# Patient Record
Sex: MALE | Race: WHITE | NOT HISPANIC OR LATINO | Employment: OTHER | URBAN - METROPOLITAN AREA
[De-identification: names, ages, dates, MRNs, and addresses within clinical notes are randomized per-mention and may not be internally consistent; named-entity substitution may affect disease eponyms.]

---

## 2018-01-11 ENCOUNTER — OFFICE VISIT (OUTPATIENT)
Dept: ORTHOPEDIC SURGERY | Facility: CLINIC | Age: 68
End: 2018-01-11

## 2018-01-11 VITALS — HEIGHT: 60 IN | TEMPERATURE: 98.4 F | WEIGHT: 194.6 LBS | BODY MASS INDEX: 38.21 KG/M2

## 2018-01-11 DIAGNOSIS — M25.552 HIP PAIN, LEFT: Primary | ICD-10-CM

## 2018-01-11 PROCEDURE — 99203 OFFICE O/P NEW LOW 30 MIN: CPT | Performed by: ORTHOPAEDIC SURGERY

## 2018-01-11 PROCEDURE — 73502 X-RAY EXAM HIP UNI 2-3 VIEWS: CPT | Performed by: ORTHOPAEDIC SURGERY

## 2018-01-11 RX ORDER — ACETAMINOPHEN 500 MG
500 TABLET ORAL EVERY 6 HOURS PRN
COMMUNITY

## 2018-01-11 RX ORDER — ASPIRIN 81 MG/1
81 TABLET ORAL DAILY
COMMUNITY

## 2018-01-11 RX ORDER — LISINOPRIL 40 MG/1
40 TABLET ORAL DAILY
COMMUNITY
End: 2019-01-01 | Stop reason: HOSPADM

## 2018-01-11 RX ORDER — CHLORTHALIDONE 25 MG/1
25 TABLET ORAL DAILY
COMMUNITY

## 2018-01-11 RX ORDER — ATORVASTATIN CALCIUM 40 MG/1
40 TABLET, FILM COATED ORAL DAILY
COMMUNITY

## 2018-01-11 RX ORDER — ESCITALOPRAM OXALATE 10 MG/1
10 TABLET ORAL DAILY
COMMUNITY

## 2018-01-11 RX ORDER — UBIDECARENONE 75 MG
50 CAPSULE ORAL DAILY
COMMUNITY

## 2018-01-11 NOTE — PROGRESS NOTES
Patient: Russell Pagan  YOB: 1950 67 y.o. male  Medical Record Number: [id number]    Chief Complaints:   Chief Complaint   Patient presents with   • Left Hip - Pain, Establish Care       History of Present Illness:Russell Pagan is a 67 y.o. male who presents with  Complaints of left hip pain.  He is here with a caretaker from his nursing home and is not a good historian and has trouble relating information to me.  His caretaker is not completely familiar with his history and current situation.  According to the notes from [facility] he has a history of Alzheimer's, hypertension, alcoholism, multiple strokes and is on multiple medications for the above.  He reports some pain throughout the hip region which is listed as severe.  He states that he could live with it as it is.  He does have pain which wakes him up at night.  He uses a walker.    Allergies: No Known Allergies    Medications:   Current Outpatient Prescriptions   Medication Sig Dispense Refill   • acetaminophen (TYLENOL) 500 MG tablet Take 500 mg by mouth Every 6 (Six) Hours As Needed for Mild Pain .     • aspirin 81 MG EC tablet Take 81 mg by mouth Daily.     • atorvastatin (LIPITOR) 40 MG tablet Take 40 mg by mouth Daily.     • chlorthalidone (HYGROTON) 25 MG tablet Take 25 mg by mouth Daily.     • escitalopram (LEXAPRO) 10 MG tablet Take 10 mg by mouth Daily.     • lisinopril (PRINIVIL,ZESTRIL) 40 MG tablet Take 40 mg by mouth Daily.     • vitamin B-12 (CYANOCOBALAMIN) 100 MCG tablet Take 50 mcg by mouth Daily.       No current facility-administered medications for this visit.          The following portions of the patient's history were reviewed and updated as appropriate: allergies, current medications, past family history, past medical history, past social history, past surgical history and problem list.    Review of Systems:   A 14 point review of systems was performed. All systems negative except pertinent  "positives/negative listed in HPI above    Physical Exam:   Vitals:    01/11/18 0845   Temp: 98.4 °F (36.9 °C)   TempSrc: Temporal Artery    Weight: 88.3 kg (194 lb 9.6 oz)   Height: 64 cm (25.2\")       General: A and O x 3, ASA, NAD    SCLERA:    Normal    DENTITION:   Normal  Hip:  left    LEG ALIGNMENT:     Neutral        LEG LENGTH DISCREPANCY   :    Difficult to assess given bilateral hip flexion contractures and general location of the stiffness    GAIT:     Antalgic with walker    SKIN:     No abnormality    RANGE OF MOTION:     Limited by joint irritability    STRENGTH:     Limited by joint irratibility    DISTAL PULSES:    Paplable    DISTAL SENSATION :   Intact    LYMPHATICS:     No   lymphadenopathy    OTHER:          +   Stinchfeld test      -    log roll      -   Tenderness to palpation trochanteric bursa        Radiology:  Xrays 2views left hip (AP bilateral hips, and lateral of the hip) ordered and reviewed for evaluation of hip pain  demonstrating  advanced, end-satge osteoarthritis with bone on bone articluation, periarticular osteophytes, and subchondral cysts. There are no previous films for comparision.    Assessment/Plan: Left hip osteoarthritis advanced in nature.  Unfortunately I feel that elective hip replacement surgery would carry with it significant risk in this patient which are elevated much more so than in comparison to the average person.  He has medical and cognitive issues which I think would preclude hip replacement surgery.  I've recommended continued use of a walker.  He can take Tylenol for pain.  She continue with therapy exercises for mobility at his nursing home/rehabilitation center.  I'll see him back on an as-needed basis.      Handy Marcano MD  1/11/2018  "

## 2018-12-27 ENCOUNTER — APPOINTMENT (OUTPATIENT)
Dept: CT IMAGING | Facility: HOSPITAL | Age: 68
End: 2018-12-27

## 2018-12-27 ENCOUNTER — HOSPITAL ENCOUNTER (INPATIENT)
Facility: HOSPITAL | Age: 68
LOS: 5 days | Discharge: NURSING, REHAB OR LONG-TERM CARE | End: 2019-01-01
Attending: EMERGENCY MEDICINE | Admitting: INTERNAL MEDICINE

## 2018-12-27 DIAGNOSIS — R22.1 LOCALIZED SWELLING, MASS OR LUMP OF NECK: ICD-10-CM

## 2018-12-27 DIAGNOSIS — R26.2 DIFFICULTY WALKING: ICD-10-CM

## 2018-12-27 DIAGNOSIS — L03.221 CELLULITIS OF NECK: Primary | ICD-10-CM

## 2018-12-27 LAB
ALBUMIN SERPL-MCNC: 3.8 G/DL (ref 3.5–5.2)
ALBUMIN/GLOB SERPL: 0.9 G/DL
ALP SERPL-CCNC: 87 U/L (ref 39–117)
ALT SERPL W P-5'-P-CCNC: 15 U/L (ref 1–41)
ANION GAP SERPL CALCULATED.3IONS-SCNC: 10.5 MMOL/L
AST SERPL-CCNC: 21 U/L (ref 1–40)
BASOPHILS # BLD AUTO: 0.01 10*3/MM3 (ref 0–0.2)
BASOPHILS NFR BLD AUTO: 0.1 % (ref 0–1.5)
BILIRUB SERPL-MCNC: 0.7 MG/DL (ref 0.1–1.2)
BUN BLD-MCNC: 19 MG/DL (ref 8–23)
BUN/CREAT SERPL: 20.7 (ref 7–25)
CALCIUM SPEC-SCNC: 9.4 MG/DL (ref 8.6–10.5)
CHLORIDE SERPL-SCNC: 99 MMOL/L (ref 98–107)
CO2 SERPL-SCNC: 23.5 MMOL/L (ref 22–29)
CREAT BLD-MCNC: 0.92 MG/DL (ref 0.76–1.27)
CRP SERPL-MCNC: 12.67 MG/DL (ref 0–0.5)
D-LACTATE SERPL-SCNC: 0.5 MMOL/L (ref 0.5–2)
DEPRECATED RDW RBC AUTO: 48.3 FL (ref 37–54)
EOSINOPHIL # BLD AUTO: 0.01 10*3/MM3 (ref 0–0.7)
EOSINOPHIL NFR BLD AUTO: 0.1 % (ref 0.3–6.2)
ERYTHROCYTE [DISTWIDTH] IN BLOOD BY AUTOMATED COUNT: 14.3 % (ref 11.5–14.5)
ERYTHROCYTE [SEDIMENTATION RATE] IN BLOOD: 78 MM/HR (ref 0–20)
GFR SERPL CREATININE-BSD FRML MDRD: 82 ML/MIN/1.73
GLOBULIN UR ELPH-MCNC: 4.4 GM/DL
GLUCOSE BLD-MCNC: 101 MG/DL (ref 65–99)
HCT VFR BLD AUTO: 30.6 % (ref 40.4–52.2)
HGB BLD-MCNC: 10.2 G/DL (ref 13.7–17.6)
IMM GRANULOCYTES # BLD AUTO: 0.03 10*3/MM3 (ref 0–0.03)
IMM GRANULOCYTES NFR BLD AUTO: 0.3 % (ref 0–0.5)
INR PPP: 1.3 (ref 0.9–1.1)
LYMPHOCYTES # BLD AUTO: 1.33 10*3/MM3 (ref 0.9–4.8)
LYMPHOCYTES NFR BLD AUTO: 12 % (ref 19.6–45.3)
MCH RBC QN AUTO: 30.5 PG (ref 27–32.7)
MCHC RBC AUTO-ENTMCNC: 33.3 G/DL (ref 32.6–36.4)
MCV RBC AUTO: 91.6 FL (ref 79.8–96.2)
MONOCYTES # BLD AUTO: 1.51 10*3/MM3 (ref 0.2–1.2)
MONOCYTES NFR BLD AUTO: 13.6 % (ref 5–12)
NEUTROPHILS # BLD AUTO: 8.21 10*3/MM3 (ref 1.9–8.1)
NEUTROPHILS NFR BLD AUTO: 74.2 % (ref 42.7–76)
PLATELET # BLD AUTO: 168 10*3/MM3 (ref 140–500)
PMV BLD AUTO: 9.9 FL (ref 6–12)
POTASSIUM BLD-SCNC: 4.9 MMOL/L (ref 3.5–5.2)
PROCALCITONIN SERPL-MCNC: 0.2 NG/ML (ref 0.1–0.25)
PROT SERPL-MCNC: 8.2 G/DL (ref 6–8.5)
PROTHROMBIN TIME: 16 SECONDS (ref 11.7–14.2)
RBC # BLD AUTO: 3.34 10*6/MM3 (ref 4.6–6)
SODIUM BLD-SCNC: 133 MMOL/L (ref 136–145)
WBC NRBC COR # BLD: 11.07 10*3/MM3 (ref 4.5–10.7)

## 2018-12-27 PROCEDURE — 85025 COMPLETE CBC W/AUTO DIFF WBC: CPT | Performed by: EMERGENCY MEDICINE

## 2018-12-27 PROCEDURE — 25010000002 IOPAMIDOL 61 % SOLUTION: Performed by: EMERGENCY MEDICINE

## 2018-12-27 PROCEDURE — 80053 COMPREHEN METABOLIC PANEL: CPT | Performed by: EMERGENCY MEDICINE

## 2018-12-27 PROCEDURE — 25010000002 PIPERACILLIN SOD-TAZOBACTAM PER 1 G: Performed by: EMERGENCY MEDICINE

## 2018-12-27 PROCEDURE — 85610 PROTHROMBIN TIME: CPT | Performed by: EMERGENCY MEDICINE

## 2018-12-27 PROCEDURE — 86140 C-REACTIVE PROTEIN: CPT | Performed by: EMERGENCY MEDICINE

## 2018-12-27 PROCEDURE — 70491 CT SOFT TISSUE NECK W/DYE: CPT

## 2018-12-27 PROCEDURE — 99284 EMERGENCY DEPT VISIT MOD MDM: CPT

## 2018-12-27 PROCEDURE — 87040 BLOOD CULTURE FOR BACTERIA: CPT | Performed by: EMERGENCY MEDICINE

## 2018-12-27 PROCEDURE — 85652 RBC SED RATE AUTOMATED: CPT | Performed by: EMERGENCY MEDICINE

## 2018-12-27 PROCEDURE — 84145 PROCALCITONIN (PCT): CPT | Performed by: EMERGENCY MEDICINE

## 2018-12-27 PROCEDURE — 83605 ASSAY OF LACTIC ACID: CPT | Performed by: EMERGENCY MEDICINE

## 2018-12-27 RX ORDER — ESCITALOPRAM OXALATE 10 MG/1
10 TABLET ORAL DAILY
Status: DISCONTINUED | OUTPATIENT
Start: 2018-12-28 | End: 2019-01-01 | Stop reason: HOSPADM

## 2018-12-27 RX ORDER — ASPIRIN 81 MG/1
81 TABLET ORAL DAILY
Status: DISCONTINUED | OUTPATIENT
Start: 2018-12-28 | End: 2019-01-01 | Stop reason: HOSPADM

## 2018-12-27 RX ORDER — CHLORTHALIDONE 25 MG/1
25 TABLET ORAL DAILY
Status: DISCONTINUED | OUTPATIENT
Start: 2018-12-28 | End: 2019-01-01 | Stop reason: HOSPADM

## 2018-12-27 RX ORDER — ACETAMINOPHEN 500 MG
500 TABLET ORAL EVERY 6 HOURS PRN
Status: DISCONTINUED | OUTPATIENT
Start: 2018-12-27 | End: 2019-01-01 | Stop reason: HOSPADM

## 2018-12-27 RX ORDER — SODIUM CHLORIDE 9 MG/ML
75 INJECTION, SOLUTION INTRAVENOUS CONTINUOUS
Status: DISCONTINUED | OUTPATIENT
Start: 2018-12-27 | End: 2019-01-01

## 2018-12-27 RX ORDER — ATORVASTATIN CALCIUM 20 MG/1
40 TABLET, FILM COATED ORAL DAILY
Status: DISCONTINUED | OUTPATIENT
Start: 2018-12-28 | End: 2019-01-01 | Stop reason: HOSPADM

## 2018-12-27 RX ORDER — HYDROCODONE BITARTRATE AND ACETAMINOPHEN 7.5; 325 MG/1; MG/1
1 TABLET ORAL EVERY 6 HOURS PRN
Status: DISCONTINUED | OUTPATIENT
Start: 2018-12-27 | End: 2019-01-01 | Stop reason: HOSPADM

## 2018-12-27 RX ADMIN — SODIUM CHLORIDE 125 ML/HR: 9 INJECTION, SOLUTION INTRAVENOUS at 15:04

## 2018-12-27 RX ADMIN — IOPAMIDOL 75 ML: 612 INJECTION, SOLUTION INTRAVENOUS at 16:30

## 2018-12-27 RX ADMIN — TAZOBACTAM SODIUM AND PIPERACILLIN SODIUM 3.38 G: 375; 3 INJECTION, SOLUTION INTRAVENOUS at 17:24

## 2018-12-28 PROBLEM — I10 PRIMARY HYPERTENSION: Status: ACTIVE | Noted: 2018-12-28

## 2018-12-28 PROBLEM — F02.80 ALZHEIMER'S DISEASE (HCC): Status: ACTIVE | Noted: 2018-12-28

## 2018-12-28 PROBLEM — F41.8 DEPRESSION WITH ANXIETY: Status: ACTIVE | Noted: 2018-12-28

## 2018-12-28 PROBLEM — I69.328 SPEECH AND LANGUAGE DEFICIT AS LATE EFFECT OF CEREBROVASCULAR ACCIDENT (CVA): Status: ACTIVE | Noted: 2018-12-28

## 2018-12-28 PROBLEM — F01.50 VASCULAR DEMENTIA WITHOUT BEHAVIORAL DISTURBANCE (HCC): Status: ACTIVE | Noted: 2018-12-28

## 2018-12-28 PROBLEM — G30.9 ALZHEIMER'S DISEASE (HCC): Status: ACTIVE | Noted: 2018-12-28

## 2018-12-28 PROBLEM — I69.320 APHASIA AS LATE EFFECT OF CEREBROVASCULAR ACCIDENT: Status: ACTIVE | Noted: 2018-12-28

## 2018-12-28 LAB
ANION GAP SERPL CALCULATED.3IONS-SCNC: 12.4 MMOL/L
BUN BLD-MCNC: 17 MG/DL (ref 8–23)
BUN/CREAT SERPL: 22.1 (ref 7–25)
CALCIUM SPEC-SCNC: 9.2 MG/DL (ref 8.6–10.5)
CHLORIDE SERPL-SCNC: 103 MMOL/L (ref 98–107)
CO2 SERPL-SCNC: 20.6 MMOL/L (ref 22–29)
CREAT BLD-MCNC: 0.77 MG/DL (ref 0.76–1.27)
DEPRECATED RDW RBC AUTO: 49.6 FL (ref 37–54)
ERYTHROCYTE [DISTWIDTH] IN BLOOD BY AUTOMATED COUNT: 14.3 % (ref 11.5–14.5)
GFR SERPL CREATININE-BSD FRML MDRD: 101 ML/MIN/1.73
GLUCOSE BLD-MCNC: 92 MG/DL (ref 65–99)
HCT VFR BLD AUTO: 33.8 % (ref 40.4–52.2)
HGB BLD-MCNC: 11 G/DL (ref 13.7–17.6)
MCH RBC QN AUTO: 30.8 PG (ref 27–32.7)
MCHC RBC AUTO-ENTMCNC: 32.5 G/DL (ref 32.6–36.4)
MCV RBC AUTO: 94.7 FL (ref 79.8–96.2)
PLATELET # BLD AUTO: 184 10*3/MM3 (ref 140–500)
PMV BLD AUTO: 10.2 FL (ref 6–12)
POTASSIUM BLD-SCNC: 5.4 MMOL/L (ref 3.5–5.2)
RBC # BLD AUTO: 3.57 10*6/MM3 (ref 4.6–6)
SODIUM BLD-SCNC: 136 MMOL/L (ref 136–145)
WBC NRBC COR # BLD: 12.46 10*3/MM3 (ref 4.5–10.7)

## 2018-12-28 PROCEDURE — 97165 OT EVAL LOW COMPLEX 30 MIN: CPT

## 2018-12-28 PROCEDURE — G8987 SELF CARE CURRENT STATUS: HCPCS

## 2018-12-28 PROCEDURE — 97162 PT EVAL MOD COMPLEX 30 MIN: CPT

## 2018-12-28 PROCEDURE — 97110 THERAPEUTIC EXERCISES: CPT

## 2018-12-28 PROCEDURE — G8989 SELF CARE D/C STATUS: HCPCS

## 2018-12-28 PROCEDURE — 25010000002 PIPERACILLIN SOD-TAZOBACTAM PER 1 G: Performed by: INTERNAL MEDICINE

## 2018-12-28 PROCEDURE — G8978 MOBILITY CURRENT STATUS: HCPCS

## 2018-12-28 PROCEDURE — G8988 SELF CARE GOAL STATUS: HCPCS

## 2018-12-28 PROCEDURE — 85027 COMPLETE CBC AUTOMATED: CPT | Performed by: INTERNAL MEDICINE

## 2018-12-28 PROCEDURE — 80048 BASIC METABOLIC PNL TOTAL CA: CPT | Performed by: INTERNAL MEDICINE

## 2018-12-28 PROCEDURE — G8979 MOBILITY GOAL STATUS: HCPCS

## 2018-12-28 RX ADMIN — TAZOBACTAM SODIUM AND PIPERACILLIN SODIUM 3.38 G: 375; 3 INJECTION, SOLUTION INTRAVENOUS at 02:45

## 2018-12-28 RX ADMIN — TAZOBACTAM SODIUM AND PIPERACILLIN SODIUM 3.38 G: 375; 3 INJECTION, SOLUTION INTRAVENOUS at 09:48

## 2018-12-28 RX ADMIN — TAZOBACTAM SODIUM AND PIPERACILLIN SODIUM 3.38 G: 375; 3 INJECTION, SOLUTION INTRAVENOUS at 18:00

## 2018-12-29 LAB
ANION GAP SERPL CALCULATED.3IONS-SCNC: 9.9 MMOL/L
BUN BLD-MCNC: 19 MG/DL (ref 8–23)
BUN/CREAT SERPL: 22.6 (ref 7–25)
CALCIUM SPEC-SCNC: 9.1 MG/DL (ref 8.6–10.5)
CHLORIDE SERPL-SCNC: 103 MMOL/L (ref 98–107)
CO2 SERPL-SCNC: 25.1 MMOL/L (ref 22–29)
CREAT BLD-MCNC: 0.84 MG/DL (ref 0.76–1.27)
DEPRECATED RDW RBC AUTO: 50.6 FL (ref 37–54)
ERYTHROCYTE [DISTWIDTH] IN BLOOD BY AUTOMATED COUNT: 14.1 % (ref 11.5–14.5)
GFR SERPL CREATININE-BSD FRML MDRD: 91 ML/MIN/1.73
GLUCOSE BLD-MCNC: 97 MG/DL (ref 65–99)
HCT VFR BLD AUTO: 36.1 % (ref 40.4–52.2)
HGB BLD-MCNC: 11.5 G/DL (ref 13.7–17.6)
MCH RBC QN AUTO: 30.7 PG (ref 27–32.7)
MCHC RBC AUTO-ENTMCNC: 31.9 G/DL (ref 32.6–36.4)
MCV RBC AUTO: 96.5 FL (ref 79.8–96.2)
PLATELET # BLD AUTO: 192 10*3/MM3 (ref 140–500)
PMV BLD AUTO: 10.7 FL (ref 6–12)
POTASSIUM BLD-SCNC: 3.9 MMOL/L (ref 3.5–5.2)
RBC # BLD AUTO: 3.74 10*6/MM3 (ref 4.6–6)
SODIUM BLD-SCNC: 138 MMOL/L (ref 136–145)
WBC NRBC COR # BLD: 7.01 10*3/MM3 (ref 4.5–10.7)

## 2018-12-29 PROCEDURE — G8998 SWALLOW D/C STATUS: HCPCS

## 2018-12-29 PROCEDURE — 85027 COMPLETE CBC AUTOMATED: CPT | Performed by: INTERNAL MEDICINE

## 2018-12-29 PROCEDURE — 92610 EVALUATE SWALLOWING FUNCTION: CPT

## 2018-12-29 PROCEDURE — 97110 THERAPEUTIC EXERCISES: CPT

## 2018-12-29 PROCEDURE — G8996 SWALLOW CURRENT STATUS: HCPCS

## 2018-12-29 PROCEDURE — G8997 SWALLOW GOAL STATUS: HCPCS

## 2018-12-29 PROCEDURE — 25010000002 PIPERACILLIN SOD-TAZOBACTAM PER 1 G: Performed by: INTERNAL MEDICINE

## 2018-12-29 PROCEDURE — 80048 BASIC METABOLIC PNL TOTAL CA: CPT | Performed by: INTERNAL MEDICINE

## 2018-12-29 RX ADMIN — ESCITALOPRAM 10 MG: 10 TABLET, FILM COATED ORAL at 09:10

## 2018-12-29 RX ADMIN — CHLORTHALIDONE 25 MG: 25 TABLET ORAL at 09:10

## 2018-12-29 RX ADMIN — TAZOBACTAM SODIUM AND PIPERACILLIN SODIUM 3.38 G: 375; 3 INJECTION, SOLUTION INTRAVENOUS at 16:01

## 2018-12-29 RX ADMIN — HYDROCODONE BITARTRATE AND ACETAMINOPHEN 1 TABLET: 7.5; 325 TABLET ORAL at 09:10

## 2018-12-29 RX ADMIN — ATORVASTATIN CALCIUM 40 MG: 20 TABLET, FILM COATED ORAL at 09:10

## 2018-12-29 RX ADMIN — ASPIRIN 81 MG: 81 TABLET, DELAYED RELEASE ORAL at 09:10

## 2018-12-29 RX ADMIN — TAZOBACTAM SODIUM AND PIPERACILLIN SODIUM 3.38 G: 375; 3 INJECTION, SOLUTION INTRAVENOUS at 09:18

## 2018-12-29 RX ADMIN — TAZOBACTAM SODIUM AND PIPERACILLIN SODIUM 3.38 G: 375; 3 INJECTION, SOLUTION INTRAVENOUS at 01:39

## 2018-12-30 LAB
ANION GAP SERPL CALCULATED.3IONS-SCNC: 11.6 MMOL/L
BUN BLD-MCNC: 17 MG/DL (ref 8–23)
BUN/CREAT SERPL: 20.5 (ref 7–25)
CALCIUM SPEC-SCNC: 9.3 MG/DL (ref 8.6–10.5)
CHLORIDE SERPL-SCNC: 102 MMOL/L (ref 98–107)
CO2 SERPL-SCNC: 27.4 MMOL/L (ref 22–29)
CREAT BLD-MCNC: 0.83 MG/DL (ref 0.76–1.27)
DEPRECATED RDW RBC AUTO: 47.4 FL (ref 37–54)
ERYTHROCYTE [DISTWIDTH] IN BLOOD BY AUTOMATED COUNT: 13.9 % (ref 11.5–14.5)
GFR SERPL CREATININE-BSD FRML MDRD: 92 ML/MIN/1.73
GLUCOSE BLD-MCNC: 115 MG/DL (ref 65–99)
HCT VFR BLD AUTO: 32.7 % (ref 40.4–52.2)
HGB BLD-MCNC: 10.6 G/DL (ref 13.7–17.6)
MCH RBC QN AUTO: 30.4 PG (ref 27–32.7)
MCHC RBC AUTO-ENTMCNC: 32.4 G/DL (ref 32.6–36.4)
MCV RBC AUTO: 93.7 FL (ref 79.8–96.2)
PLATELET # BLD AUTO: 230 10*3/MM3 (ref 140–500)
PMV BLD AUTO: 10.1 FL (ref 6–12)
POTASSIUM BLD-SCNC: 3.8 MMOL/L (ref 3.5–5.2)
RBC # BLD AUTO: 3.49 10*6/MM3 (ref 4.6–6)
SODIUM BLD-SCNC: 141 MMOL/L (ref 136–145)
WBC NRBC COR # BLD: 6.49 10*3/MM3 (ref 4.5–10.7)

## 2018-12-30 PROCEDURE — 85027 COMPLETE CBC AUTOMATED: CPT | Performed by: INTERNAL MEDICINE

## 2018-12-30 PROCEDURE — 25010000002 PIPERACILLIN SOD-TAZOBACTAM PER 1 G: Performed by: INTERNAL MEDICINE

## 2018-12-30 PROCEDURE — 97110 THERAPEUTIC EXERCISES: CPT

## 2018-12-30 PROCEDURE — 80048 BASIC METABOLIC PNL TOTAL CA: CPT | Performed by: INTERNAL MEDICINE

## 2018-12-30 RX ORDER — AMOXICILLIN AND CLAVULANATE POTASSIUM 250; 62.5 MG/5ML; MG/5ML
500 POWDER, FOR SUSPENSION ORAL EVERY 8 HOURS SCHEDULED
Status: DISCONTINUED | OUTPATIENT
Start: 2018-12-30 | End: 2019-01-01 | Stop reason: HOSPADM

## 2018-12-30 RX ADMIN — TAZOBACTAM SODIUM AND PIPERACILLIN SODIUM 3.38 G: 375; 3 INJECTION, SOLUTION INTRAVENOUS at 08:09

## 2018-12-30 RX ADMIN — HYDROCODONE BITARTRATE AND ACETAMINOPHEN 1 TABLET: 7.5; 325 TABLET ORAL at 08:09

## 2018-12-30 RX ADMIN — ATORVASTATIN CALCIUM 40 MG: 20 TABLET, FILM COATED ORAL at 08:09

## 2018-12-30 RX ADMIN — CHLORTHALIDONE 25 MG: 25 TABLET ORAL at 08:09

## 2018-12-30 RX ADMIN — AMOXICILLIN AND CLAVULANATE POTASSIUM 500 MG: 250; 62.5 POWDER, FOR SUSPENSION ORAL at 23:21

## 2018-12-30 RX ADMIN — ASPIRIN 81 MG: 81 TABLET, DELAYED RELEASE ORAL at 08:09

## 2018-12-30 RX ADMIN — ESCITALOPRAM 10 MG: 10 TABLET, FILM COATED ORAL at 08:09

## 2018-12-30 RX ADMIN — TAZOBACTAM SODIUM AND PIPERACILLIN SODIUM 3.38 G: 375; 3 INJECTION, SOLUTION INTRAVENOUS at 01:36

## 2018-12-30 RX ADMIN — AMOXICILLIN AND CLAVULANATE POTASSIUM 500 MG: 250; 62.5 POWDER, FOR SUSPENSION ORAL at 15:12

## 2018-12-31 PROCEDURE — 97110 THERAPEUTIC EXERCISES: CPT

## 2018-12-31 RX ADMIN — AMOXICILLIN AND CLAVULANATE POTASSIUM 500 MG: 250; 62.5 POWDER, FOR SUSPENSION ORAL at 07:35

## 2018-12-31 RX ADMIN — ESCITALOPRAM 10 MG: 10 TABLET, FILM COATED ORAL at 07:35

## 2018-12-31 RX ADMIN — AMOXICILLIN AND CLAVULANATE POTASSIUM 500 MG: 250; 62.5 POWDER, FOR SUSPENSION ORAL at 15:22

## 2018-12-31 RX ADMIN — ATORVASTATIN CALCIUM 40 MG: 20 TABLET, FILM COATED ORAL at 07:35

## 2018-12-31 RX ADMIN — ASPIRIN 81 MG: 81 TABLET, DELAYED RELEASE ORAL at 07:35

## 2018-12-31 RX ADMIN — CHLORTHALIDONE 25 MG: 25 TABLET ORAL at 07:35

## 2019-01-01 VITALS
SYSTOLIC BLOOD PRESSURE: 142 MMHG | HEIGHT: 69 IN | BODY MASS INDEX: 29.62 KG/M2 | OXYGEN SATURATION: 96 % | HEART RATE: 66 BPM | TEMPERATURE: 98.1 F | RESPIRATION RATE: 16 BRPM | DIASTOLIC BLOOD PRESSURE: 82 MMHG | WEIGHT: 200 LBS

## 2019-01-01 LAB
ANION GAP SERPL CALCULATED.3IONS-SCNC: 13.7 MMOL/L
BACTERIA SPEC AEROBE CULT: NORMAL
BACTERIA SPEC AEROBE CULT: NORMAL
BUN BLD-MCNC: 17 MG/DL (ref 8–23)
BUN/CREAT SERPL: 22.1 (ref 7–25)
CALCIUM SPEC-SCNC: 9.6 MG/DL (ref 8.6–10.5)
CHLORIDE SERPL-SCNC: 100 MMOL/L (ref 98–107)
CO2 SERPL-SCNC: 26.3 MMOL/L (ref 22–29)
CREAT BLD-MCNC: 0.77 MG/DL (ref 0.76–1.27)
DEPRECATED RDW RBC AUTO: 48.1 FL (ref 37–54)
ERYTHROCYTE [DISTWIDTH] IN BLOOD BY AUTOMATED COUNT: 13.6 % (ref 11.5–14.5)
GFR SERPL CREATININE-BSD FRML MDRD: 101 ML/MIN/1.73
GLUCOSE BLD-MCNC: 115 MG/DL (ref 65–99)
HCT VFR BLD AUTO: 36.4 % (ref 40.4–52.2)
HGB BLD-MCNC: 11.4 G/DL (ref 13.7–17.6)
MAGNESIUM SERPL-MCNC: 1.9 MG/DL (ref 1.6–2.4)
MCH RBC QN AUTO: 30.3 PG (ref 27–32.7)
MCHC RBC AUTO-ENTMCNC: 31.3 G/DL (ref 32.6–36.4)
MCV RBC AUTO: 96.8 FL (ref 79.8–96.2)
PLATELET # BLD AUTO: 257 10*3/MM3 (ref 140–500)
PMV BLD AUTO: 9.4 FL (ref 6–12)
POTASSIUM BLD-SCNC: 3.9 MMOL/L (ref 3.5–5.2)
RBC # BLD AUTO: 3.76 10*6/MM3 (ref 4.6–6)
SODIUM BLD-SCNC: 140 MMOL/L (ref 136–145)
WBC NRBC COR # BLD: 4.79 10*3/MM3 (ref 4.5–10.7)

## 2019-01-01 PROCEDURE — 85027 COMPLETE CBC AUTOMATED: CPT | Performed by: INTERNAL MEDICINE

## 2019-01-01 PROCEDURE — 83735 ASSAY OF MAGNESIUM: CPT | Performed by: INTERNAL MEDICINE

## 2019-01-01 PROCEDURE — 80048 BASIC METABOLIC PNL TOTAL CA: CPT | Performed by: INTERNAL MEDICINE

## 2019-01-01 RX ORDER — AMOXICILLIN AND CLAVULANATE POTASSIUM 250; 62.5 MG/5ML; MG/5ML
500 POWDER, FOR SUSPENSION ORAL EVERY 8 HOURS SCHEDULED
Qty: 110 ML | Refills: 0 | Status: SHIPPED | OUTPATIENT
Start: 2019-01-01 | End: 2019-01-05

## 2019-01-01 RX ADMIN — ATORVASTATIN CALCIUM 40 MG: 20 TABLET, FILM COATED ORAL at 10:27

## 2019-01-01 RX ADMIN — ASPIRIN 81 MG: 81 TABLET, DELAYED RELEASE ORAL at 10:27

## 2019-01-01 RX ADMIN — CHLORTHALIDONE 25 MG: 25 TABLET ORAL at 10:27

## 2019-01-01 RX ADMIN — ESCITALOPRAM 10 MG: 10 TABLET, FILM COATED ORAL at 10:27

## 2019-01-01 NOTE — PLAN OF CARE
Problem: Fall Risk (Adult)  Goal: Absence of Fall  Outcome: Ongoing (interventions implemented as appropriate)      Problem: Patient Care Overview  Goal: Plan of Care Review  Outcome: Ongoing (interventions implemented as appropriate)   01/01/19 0152   Coping/Psychosocial   Plan of Care Reviewed With patient   Plan of Care Review   Progress improving   OTHER   Outcome Summary patient ambulating with assistance, facial swelling has decreased since admission, combative and uncooperative at times, unable to give abx tonight     Goal: Individualization and Mutuality  Outcome: Ongoing (interventions implemented as appropriate)    Goal: Discharge Needs Assessment  Outcome: Ongoing (interventions implemented as appropriate)    Goal: Interprofessional Rounds/Family Conf  Outcome: Ongoing (interventions implemented as appropriate)      Problem: Skin Injury Risk (Adult)  Goal: Skin Health and Integrity  Outcome: Ongoing (interventions implemented as appropriate)      Problem: Pain, Acute (Adult)  Goal: Acceptable Pain Control/Comfort Level  Outcome: Ongoing (interventions implemented as appropriate)

## 2019-01-01 NOTE — DISCHARGE SUMMARY
PHYSICIAN DISCHARGE SUMMARY  [facility], [facility]    Patient Identification:    Name: Russell Pagan  Age: 68 y.o.  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 (CVA)    Primary hypertension    Depression with anxiety    Alzheimer's disease    Vascular dementia without behavioral disturbance    Patient Active Problem List   Diagnosis Code   • Cellulitis of neck L03.221   • Alzheimer's disease G30.9, F02.80   • Vascular dementia without behavioral disturbance F01.50   • Primary hypertension I10   • Depression with anxiety F41.8   • Aphasia as late effect of cerebrovascular accident I69.320   • Speech and language deficit as late effect of cerebrovascular accident (CVA) I69.328          Hospital Course: Russell Pagan  is a  68-year-old gentleman, resident of the nursing home.  Patient has history of Alzheimer disease, CVA, hypertension, vascular dementia.  Patient underwent a tooth extraction last week, and since then he started to develop right-sided facial swelling and tenderness, this progressively got worse, and at this time the swelling and tenderness is down to his anterior neck as well as the lateral side of his neck, this is very tender to palpation.  Patient was sent to the emergency room, his white blood cell count was 11.07, CT of the neck showed cellulitis of neck and face and  possible abscess.  Patient was started on antibiotics and admitted to the hospital for further management.    Upon admission patient was given IV fluids, was started on IV antibiotics for a infection/cellulitis and probably abscess of the face and neck.  A consultation for oral and  maxillofacial surgery was requested, however after multiple attempts all their physicians, including the one on call, refused to see the patient.  Patient started to improve on medical treatment, his medications were changed to by mouth a couple of days ago, at this time the swelling is almost resolved, he has no pain on examination. Patient at this time is stable enough to be discharged back home, patient is to complete his antibiotic course.        PMHX:   Past Medical History:   Diagnosis Date   • Alcohol dependence in remission (CMS/HCC)    • Altered mental status, unspecified    • Alzheimer disease    • Alzheimer's disease    • Aphasia as late effect of cerebrovascular accident    • BPH without obstruction/lower urinary tract symptoms    • Cataract    • Cerebral infarction (CMS/HCC)    • Cognitive communication deficit    • Dementia associated with alcoholism without behavioral disturbance (CMS/HCC)    • Depression with anxiety    • History of stomach ulcers    • Hyperlipidemia    • Incontinence    • Lack of coordination as late effect of cerebrovascular accident (CVA)    • Muscle spasm    • Nicotine dependence    • Nicotine dependence in remission    • Patellar bursitis of left knee    • Patellar bursitis of right knee    • Primary hypertension    • Prostatic hypertrophy    • 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 No pertinent surgical history.        Consults:     Consults     Date and Time Order Name Status Description    12/27/2018 2246 Inpatient Oral & Maxillofacial Surgery Consult      12/27/2018 1706 Family Medicine Consult Completed           Discharge Exam:    /82 (BP Location: Right arm, Patient Position: Lying)   Pulse 66   Temp 98.1 °F (36.7 °C)  "(Oral)   Resp 16   Ht 175.3 cm (69\")   Wt 90.7 kg (200 lb)   SpO2 96%   BMI 29.53 kg/m²     General: Alert, oriented x 1. Cooperative, no distress  Neck: The edema and tenderness in the right side of lower lip, right side of the face, lateral aspect of the neck have resolved, no pain during examination.  Cardiovascular: Normal rate, regular rhythm and intact distal pulses.              Exam reveals no gallop and no friction rub. No murmur heard  Chest wall: No tenderness or deformity  Pulmonary: Clear to auscultation bilaterally, respirations unlabored.               No rhonchi, wheezing or rales.   Abdominal: Soft. Soft, non-tender, bowel sounds active all four quadrants,     no masses, no hepatomegaly, no splenomegaly.   Extremities: Normal, atraumatic, no cyanosis or edema  Pulses: 2 + symmetric all extremities  Neurological: Patient is alert and oriented x1. CNII-XII intact, normal strength, sensation intact throughout  Skin: Skin color, texture, normal. Turgor is decreased. No rashes or lesions      Data Review:        Results from last 7 days   Lab Units  01/01/19   1112  12/30/18   1035  12/29/18   0532   WBC 10*3/mm3  4.79  6.49  7.01   HEMOGLOBIN g/dL  11.4*  10.6*  11.5*   HEMATOCRIT %  36.4*  32.7*  36.1*   PLATELETS 10*3/mm3  257  230  192       Results from last 7 days   Lab Units  01/01/19   1112  12/30/18   1035  12/29/18   0533   12/27/18   1459   SODIUM mmol/L  140  141  138   < >  133*   POTASSIUM mmol/L  3.9  3.8  3.9   < >  4.9   CHLORIDE mmol/L  100  102  103   < >  99   CO2 mmol/L  26.3  27.4  25.1   < >  23.5   BUN mg/dL  17  17  19   < >  19   CREATININE mg/dL  0.77  0.83  0.84   < >  0.92   CALCIUM mg/dL  9.6  9.3  9.1   < >  9.4   BILIRUBIN mg/dL   --    --    --    --   0.7   ALK PHOS U/L   --    --    --    --   87   ALT (SGPT) U/L   --    --    --    --   15   AST (SGOT) U/L   --    --    --    --   21   GLUCOSE mg/dL  115*  115*  97   < >  101*    < > = values in this interval not " displayed.     Results from last 7 days   Lab Units  12/27/18   1459   INR   1.30*       No results found for: TROPONINT    Microbiology Results (last 10 days)     Procedure Component Value - Date/Time    Blood Culture - Blood, Arm, Right [[id number]] Collected:  12/27/18 1640    Lab Status:  Preliminary result Specimen:  Blood from Arm, Right Updated:  12/31/18 1701     Blood Culture No growth at 4 days    Blood Culture - Blood, Hand, Left [[id number]] Collected:  12/27/18 1500    Lab Status:  Preliminary result Specimen:  Blood from Hand, Left Updated:  12/31/18 1515     Blood Culture No growth at 4 days           Imaging Results (all)     Procedure Component Value Units Date/Time    CT Soft Tissue Neck With Contrast [[id number]] Collected:  12/27/18 1716     Updated:  12/27/18 1722    Narrative:       CT NECK WITH CONTRAST     HISTORY: Neck swelling.  .     TECHNIQUE: A CT examination of the neck was performed after the  intravenous administration of contrast. No prior study is available for  comparison.     FINDINGS:     FINDINGS: There is extensive cellulitis involving the soft tissues of  the face most prominent overlying the anterolateral aspect of the  mandible to the right and maxilla to the right. There is irregularity  involving the cortex of the mandible which may be secondary to recent  extraction of a tooth, however, osteomyelitis could not be excluded.  There is periapical lucency at the site of a remaining tooth involving  the maxilla anterolaterally to the left. This may represent a left  maxillary canine. There is no evidence of fracture.     There is extensive thickening and focal soft tissue identified  anterolateral to the mandible on the right. This may represent an area  of phlegmonous change. No discrete fluid collection to suggest a  drainable abscess is identified. There is thickening of the platysmas  muscle on the right as well as adjacent stranding. Small subcentimeter  nodes are identified  involving the posterior triangle of neck  bilaterally. There is a 1.5 cm nose appreciated involving the posterior  triangle of neck on the left and a 18 mm node involving the posterior  triangle of neck on the right. The parotid glands appear unremarkable.  Mildly prominent nodes involving the submandibular space are appreciated  bilaterally measuring approximately 1.5 cm in size. Submental nodes are  appreciated measuring 1 cm maximally. The cords are symmetrical. There  is no evidence of subglottic edema.     There is severe loss of disc height at C3-4 and C5-6 and C6-7. Extensive  facet degenerative disease is present to the left at C2-3 and C3-4.  There is moderate-to-severe facet degenerative disease to the right at  C4-5.       Impression:       1. Extensive soft tissue swelling adjacent to the mandible  anterolaterally to the right extending superiorly to the maxilla to a  lesser extent. There is thickening of the platysmas muscle and stranding  of the soft tissues of the face anterolaterally to the right suggesting  adjacent cellulitis. Immediately anterolateral to the mandible is an  area of soft tissue prominence, somewhat ill-defined measuring  approximately 4 x 2 cm in size. This may represent an area of  phlegmonous change. No discrete drainable fluid collection to suggest a  drainable abscess is identified. This is adjacent to the mandible where  there is irregularity, nonspecific. I could not exclude osteomyelitis.  2. Mildly prominent nodes are appreciated as described.  3. Periapical lucency adjacent to what is likely the left maxillary  canine.   4. Degenerative disease involving the cervical spine.     Findings were called to and discussed with Dr. Ohara at the time of the  dictation.     Radiation dose reduction techniques were utilized, including automated  exposure control and exposure modulation based on body size.     This report was finalized on 12/27/2018 5:19 PM by Dr. Muniz  MADELAINE Jimenez               Disposition:    Skilled nursing facility    Patient Instructions:        Discharge Medications      New Medications      Instructions Start Date   amoxicillin-clavulanate 250-62.5 MG/5ML suspension  Commonly known as:  AUGMENTIN   500 mg, Oral, Every 8 Hours Scheduled         Continue These Medications      Instructions Start Date   acetaminophen 500 MG tablet  Commonly known as:  TYLENOL   500 mg, Oral, Every 6 Hours PRN      aspirin 81 MG EC tablet   81 mg, Oral, Daily      atorvastatin 40 MG tablet  Commonly known as:  LIPITOR   40 mg, Oral, Daily      chlorthalidone 25 MG tablet  Commonly known as:  HYGROTON   25 mg, Oral, Daily      escitalopram 10 MG tablet  Commonly known as:  LEXAPRO   10 mg, Oral, Daily      magnesium hydroxide 2400 MG/10ML suspension suspension  Commonly known as:  MILK OF MAGNESIA   10 mL, Oral, Daily PRN      vitamin B-12 100 MCG tablet  Commonly known as:  CYANOCOBALAMIN   50 mcg, Oral, Daily         Stop These Medications    lisinopril 40 MG tablet  Commonly known as:  PRINIVIL,ZESTRIL            Discharge Order (From admission, onward)    Start     Ordered    01/01/19 1157  Discharge patient  Once     Expected Discharge Date:  01/01/19    Discharge Disposition:  Skilled Nursing Facility (DC - External)    Physician of Record for Attribution - Please select from Treatment Team:  TENNILLE OLIVEROS [[id number]]    Review needed by CMO to determine Physician of Record:  No       Question Answer Comment   Physician of Record for Attribution - Please select from Treatment Team TENNILLE OLIVEROS    Review needed by CMO to determine Physician of Record No        01/01/19 0878           Contact information for follow-up providers     Tennille Oliveros MD .    Specialties:  Internal Medicine, Hospitalist  Contact information:  [address] [place], [place] [id number] [phone]                   Contact information for after-discharge care     Destination     [facility] .    Service:  Skilled Nursing  Contact information:  [address]  [facility] [address] [phone]                             Total time spent discharging patient including evaluation,post hospitalization follow up,  medication and post hospitalization instructions and education total time exceeds 30 minutes.    Signed:  Samuel Oliveros MD  1/1/2019  12:08 PM

## 2019-01-01 NOTE — PLAN OF CARE
Problem: Patient Care Overview  Goal: Plan of Care Review  Outcome: Ongoing (interventions implemented as appropriate)   01/01/19 0152 01/01/19 1027 01/01/19 1338   Coping/Psychosocial   Plan of Care Reviewed With --  patient --    Plan of Care Review   Progress improving --  --    OTHER   Outcome Summary --  --  pt admitted with neck cellulitis. swelling has decreased with PO ABX. pt has no C/O pain. aphasia R/T CVA. pivot to chair. uncooperative and combative at times. VSS. possible D/C to [facility] today. will cont to monitor,      Goal: Individualization and Mutuality  Outcome: Ongoing (interventions implemented as appropriate)    Goal: Discharge Needs Assessment  Outcome: Ongoing (interventions implemented as appropriate)    Goal: Interprofessional Rounds/Family Conf  Outcome: Ongoing (interventions implemented as appropriate)      Problem: Skin Injury Risk (Adult)  Goal: Identify Related Risk Factors and Signs and Symptoms  Outcome: Ongoing (interventions implemented as appropriate)    Goal: Skin Health and Integrity  Outcome: Ongoing (interventions implemented as appropriate)      Problem: Pain, Acute (Adult)  Goal: Identify Related Risk Factors and Signs and Symptoms  Outcome: Ongoing (interventions implemented as appropriate)    Goal: Acceptable Pain Control/Comfort Level  Outcome: Ongoing (interventions implemented as appropriate)

## 2019-09-03 ENCOUNTER — OFFICE VISIT (OUTPATIENT)
Dept: ORTHOPEDIC SURGERY | Facility: CLINIC | Age: 69
End: 2019-09-03

## 2019-09-03 DIAGNOSIS — M25.552 HIP PAIN, LEFT: Primary | ICD-10-CM

## 2019-09-03 PROCEDURE — 73502 X-RAY EXAM HIP UNI 2-3 VIEWS: CPT | Performed by: ORTHOPAEDIC SURGERY

## 2019-09-03 PROCEDURE — 99213 OFFICE O/P EST LOW 20 MIN: CPT | Performed by: ORTHOPAEDIC SURGERY

## 2019-09-03 RX ORDER — DIVALPROEX SODIUM 125 MG/1
125 TABLET, DELAYED RELEASE ORAL 3 TIMES DAILY
COMMUNITY

## 2019-09-03 RX ORDER — HYDROCODONE BITARTRATE AND ACETAMINOPHEN 5; 325 MG/1; MG/1
1 TABLET ORAL EVERY 6 HOURS PRN
COMMUNITY

## 2019-09-03 NOTE — PROGRESS NOTES
Patient: Russell Pagan  YOB: 1950 69 y.o. male  Medical Record Number: [id number]    Chief Complaints:   Chief Complaint   Patient presents with   • Left Hip - Follow-up       History of Present Illness:Russell Pagan is a 69 y.o. male who presents with complaints of left hip pain.  He has some cognitive issues has a history of Alzheimer's multiple strokes and he is here from the nursing home for evaluation of hip.  He is not a good historian and all of the information comes from his caretaker.  She reports stiffness and pain throughout the hip he is having difficulty getting around.  He does use a walker and still able ambulate with his walker.    Allergies: No Known Allergies    Medications:   Current Outpatient Medications   Medication Sig Dispense Refill   • acetaminophen (TYLENOL) 500 MG tablet Take 500 mg by mouth Every 6 (Six) Hours As Needed for Mild Pain .     • aspirin 81 MG EC tablet Take 81 mg by mouth Daily.     • atorvastatin (LIPITOR) 40 MG tablet Take 40 mg by mouth Daily.     • chlorthalidone (HYGROTON) 25 MG tablet Take 25 mg by mouth Daily.     • divalproex (DEPAKOTE) 125 MG DR tablet Take 125 mg by mouth 3 (Three) Times a Day.     • escitalopram (LEXAPRO) 10 MG tablet Take 10 mg by mouth Daily.     • HYDROcodone-acetaminophen (NORCO) 5-325 MG per tablet Take 1 tablet by mouth Every 6 (Six) Hours As Needed.     • magnesium hydroxide (MILK OF MAGNESIA) 2400 MG/10ML suspension suspension Take 10 mL by mouth Daily As Needed.     • vitamin B-12 (CYANOCOBALAMIN) 100 MCG tablet Take 50 mcg by mouth Daily.       No current facility-administered medications for this visit.          The following portions of the patient's history were reviewed and updated as appropriate: allergies, current medications, past family history, past medical history, past social history, past surgical history and problem list.    Review of Systems:   A 14 point review of systems was performed. All systems negative  except pertinent positives/negative listed in HPI above    Physical Exam:   Vitals:    09/03/19 0836   Weight: Comment: NWB   Height: Comment: NWB     General: A and O x 3, ASA, NAD                          SCLERA:    Normal                          DENTITION:   Normal  Hip:  left                          LEG ALIGNMENT:     Neutral                              LEG LENGTH DISCREPANCY   :    Difficult to assess given bilateral hip flexion contractures and general location of the stiffness                          GAIT:     Antalgic with walker                          SKIN:     No abnormality                          RANGE OF MOTION:     Limited by joint irritability                          STRENGTH:     Limited by joint irratibility                          DISTAL PULSES:    Paplable                          DISTAL SENSATION :   Intact                          LYMPHATICS:     No   lymphadenopathy                          OTHER:          +   Stinchfeld test                                                  -    log roll                                                  -   Tenderness to palpation trochanteric bursa                              Radiology:  Xrays 2views left hip (AP bilateral hips, and lateral of the hip) ordered and reviewed for evaluation of hip pain  demonstrating  advanced, end-satge osteoarthritis with bone on bone articluation, periarticular osteophytes, and subchondral cysts. There are no previous films for comparision.     Assessment/Plan: Left hip osteoarthritis advanced in nature.  Unfortunately I feel that elective hip replacement surgery would carry with it significant risk in this patient which are elevated much more so than in comparison to the average person.  He has medical and cognitive issues which I think would preclude hip replacement surgery.  I've recommended continued use of a walker.  He can take Tylenol for pain.  She continue with therapy exercises for mobility at his nursing  home/rehabilitation center.  I'll see him back on an as-needed basis.  I did call his wife on the phone and had a discussion with his wife she understands this predicament and is in agreement to avoid surgical treatment of this problem.          Handy Marcano MD  9/3/2019